# Patient Record
Sex: FEMALE | Race: WHITE | NOT HISPANIC OR LATINO | Employment: OTHER | URBAN - METROPOLITAN AREA
[De-identification: names, ages, dates, MRNs, and addresses within clinical notes are randomized per-mention and may not be internally consistent; named-entity substitution may affect disease eponyms.]

---

## 2017-09-06 ENCOUNTER — OFFICE VISIT (OUTPATIENT)
Dept: CARDIOLOGY | Facility: CLINIC | Age: 62
End: 2017-09-06
Payer: COMMERCIAL

## 2017-09-06 VITALS
SYSTOLIC BLOOD PRESSURE: 134 MMHG | HEART RATE: 81 BPM | OXYGEN SATURATION: 97 % | DIASTOLIC BLOOD PRESSURE: 73 MMHG | WEIGHT: 248.38 LBS

## 2017-09-06 DIAGNOSIS — I10 ESSENTIAL HYPERTENSION: ICD-10-CM

## 2017-09-06 DIAGNOSIS — E78.5 HYPERLIPIDEMIA, UNSPECIFIED HYPERLIPIDEMIA TYPE: Primary | ICD-10-CM

## 2017-09-06 PROCEDURE — 3078F DIAST BP <80 MM HG: CPT | Mod: S$GLB,,, | Performed by: INTERNAL MEDICINE

## 2017-09-06 PROCEDURE — 99204 OFFICE O/P NEW MOD 45 MIN: CPT | Mod: S$GLB,,, | Performed by: INTERNAL MEDICINE

## 2017-09-06 PROCEDURE — 3075F SYST BP GE 130 - 139MM HG: CPT | Mod: S$GLB,,, | Performed by: INTERNAL MEDICINE

## 2017-09-06 RX ORDER — PHENYTOIN SODIUM 100 MG/1
CAPSULE, EXTENDED RELEASE ORAL
COMMUNITY

## 2017-09-06 RX ORDER — OMEGA-3S/DHA/EPA/FISH OIL/D3 360MG-1000
CAPSULE ORAL
COMMUNITY

## 2017-09-06 RX ORDER — LOSARTAN POTASSIUM 50 MG/1
50 TABLET ORAL DAILY
COMMUNITY

## 2017-09-06 RX ORDER — OMEPRAZOLE 20 MG/1
20 TABLET, DELAYED RELEASE ORAL
COMMUNITY

## 2017-09-06 RX ORDER — GARLIC 1000 MG
CAPSULE ORAL
COMMUNITY
End: 2018-10-24

## 2017-09-06 RX ORDER — VENLAFAXINE 75 MG/1
75 TABLET ORAL DAILY
COMMUNITY

## 2017-09-06 RX ORDER — ATORVASTATIN CALCIUM 10 MG/1
10 TABLET, FILM COATED ORAL DAILY
COMMUNITY

## 2017-09-06 RX ORDER — TRIAMTERENE AND HYDROCHLOROTHIAZIDE 37.5; 25 MG/1; MG/1
1 CAPSULE ORAL
COMMUNITY

## 2017-09-06 NOTE — PROGRESS NOTES
Chief Complaint:  Lety Perez is a 62 y.o. female who presents for evaluation of Excessive Sweating; Nausea; and Dizziness      HPI:   Ms. Perez is a 62 year-old female with a history of HTN, HLP, Epilepsy who is self-referred for numerous symptoms of diaphoresis, dyspnea, lightheadedness, nausea.  Symptoms occur frequently and intermittently.  She has been under a great deal of stress recently after the expiration of her mother with a long and complicate course post TAVR.  Ms. Perez reports no prior history of cardiac disease.  She denies exertional chest pain.  Treated for HTN for past 5-10 years.      Patient Active Problem List    Diagnosis Date Noted    Breast nodule 10/06/2014    Hyperglycemia 10/06/2014    Depression 2014    Epilepsy 2014    GERD (gastroesophageal reflux disease) 2014    HLD (hyperlipidemia) 2014    HTN (hypertension) 2014    Obesity 2014       Right Arm BP - Sittin/73  Left Arm BP - Sittin/71    Current Outpatient Prescriptions   Medication Sig    atorvastatin (LIPITOR) 10 MG tablet Take 10 mg by mouth once daily.    garlic 1,000 mg Cap Take by mouth.    losartan (COZAAR) 50 MG tablet Take 50 mg by mouth once daily.    multivitamin-Ca-iron-minerals Tab Take by mouth.    omega-3s-dha-epa-fish oil-D3 360 mg-1,200 mg -1,000 unit Cap Take by mouth.    omeprazole (PRILOSEC OTC) 20 MG tablet Take 20 mg by mouth.    phenytoin (DILANTIN) 100 MG ER capsule Take three capsules (300 mg total) by mouth twice daily.    triamterene-hydrochlorothiazide 37.5-25 mg (DYAZIDE) 37.5-25 mg per capsule Take 1 capsule by mouth.    venlafaxine (EFFEXOR) 75 MG tablet Take 75 mg by mouth once daily.     No current facility-administered medications for this visit.        Review of Systems   Constitution: Positive for diaphoresis, weakness and malaise/fatigue. Negative for weight gain and weight loss.   HENT: Negative for nosebleeds.     Cardiovascular: Positive for dyspnea on exertion. Negative for chest pain, claudication, cyanosis, irregular heartbeat, leg swelling, near-syncope, orthopnea, palpitations, paroxysmal nocturnal dyspnea and syncope.   Respiratory: Negative for cough, hemoptysis, shortness of breath, sleep disturbances due to breathing, snoring, sputum production and wheezing.    Hematologic/Lymphatic: Negative for bleeding problem. Does not bruise/bleed easily.   Skin: Negative for poor wound healing and rash.   Musculoskeletal: Negative for myalgias.   Gastrointestinal: Negative for abdominal pain, heartburn, hematemesis, hematochezia, hemorrhoids and melena.   Neurological: Negative for dizziness, focal weakness, light-headedness and loss of balance.   Psychiatric/Behavioral: Negative for altered mental status, depression and memory loss.         Objective:     Physical Exam   Constitutional: She is oriented to person, place, and time. She appears well-developed and well-nourished. No distress. She is not intubated.   HENT:   Head: Atraumatic.   Neck: No JVD present.   Cardiovascular: Normal rate, regular rhythm, S1 normal, S2 normal, normal heart sounds and intact distal pulses.  PMI is not displaced.  Exam reveals no gallop, no S3, no S4, no distant heart sounds, no friction rub, no midsystolic click and no opening snap.    No murmur heard.  Pulses:       Carotid pulses are 2+ on the right side, and 2+ on the left side.       Radial pulses are 2+ on the right side, and 2+ on the left side.   Pulmonary/Chest: Effort normal and breath sounds normal. No accessory muscle usage. No apnea, no tachypnea and no bradypnea. She is not intubated. No respiratory distress. She has no decreased breath sounds. She has no wheezes. She has no rhonchi. She has no rales. She exhibits no tenderness.   Abdominal: Soft. Normal aorta and bowel sounds are normal. She exhibits no distension, no abdominal bruit, no pulsatile midline mass and no mass.  There is no tenderness.   Musculoskeletal: She exhibits no edema.   Neurological: She is alert and oriented to person, place, and time.   Skin: Skin is warm. No rash noted. No erythema. No pallor.   Psychiatric: She has a normal mood and affect. Her behavior is normal. Judgment and thought content normal.   Vitals reviewed.      LABS    ECHOCARDIOGRAM:    ECG:    STRESS TESTING:    CARDIAC CATHETERIZATION:    Assessment:     1. Hyperlipidemia, unspecified hyperlipidemia type    2. Essential hypertension      BP is controlled.  Suspicious for hypertensive heart disease  Plan:   -Assess BMP and FLP  -TTE  -RTC in 2 months.  -Can likely consolidate Losartan and HCTZ in future.    --2 or more stable chronic conditions were addressed    -An undiagnosed new problem with uncertain prognosis was addressed      Continue with current medical plan and lifestyle changes.    I have reviewed the patient's medical history in detail and updated the computerized patient record.    Orders Placed This Encounter   Procedures    Basic metabolic panel     Standing Status:   Future     Standing Expiration Date:   11/5/2018    Lipid panel     Standing Status:   Future     Standing Expiration Date:   11/5/2018    2D echo with color flow doppler     Standing Status:   Future     Standing Expiration Date:   9/6/2018       Follow up as scheduled. Return sooner for concerns or questions      She expressed verbal understanding and agreed with the plan          Cosmo Woodward MD, FACC, CCDS  Interventional Cardiology  [facility]

## 2019-09-12 ENCOUNTER — OUTSIDE PLACE OF SERVICE (OUTPATIENT)
Dept: CARDIOLOGY | Facility: CLINIC | Age: 64
End: 2019-09-12
Payer: COMMERCIAL

## 2019-09-12 PROCEDURE — 93010 PR ELECTROCARDIOGRAM REPORT: ICD-10-PCS | Mod: ,,, | Performed by: INTERNAL MEDICINE

## 2019-09-12 PROCEDURE — 93010 ELECTROCARDIOGRAM REPORT: CPT | Mod: ,,, | Performed by: INTERNAL MEDICINE
